# Patient Record
Sex: MALE | Race: WHITE | HISPANIC OR LATINO | Employment: OTHER | URBAN - METROPOLITAN AREA
[De-identification: names, ages, dates, MRNs, and addresses within clinical notes are randomized per-mention and may not be internally consistent; named-entity substitution may affect disease eponyms.]

---

## 2023-03-31 ENCOUNTER — HOSPITAL ENCOUNTER (EMERGENCY)
Facility: CLINIC | Age: 63
Discharge: HOME | End: 2023-03-31
Attending: EMERGENCY MEDICINE | Admitting: EMERGENCY MEDICINE
Payer: COMMERCIAL

## 2023-03-31 ENCOUNTER — APPOINTMENT (OUTPATIENT)
Dept: CARDIOLOGY | Facility: CLINIC | Age: 63
End: 2023-03-31
Attending: EMERGENCY MEDICINE
Payer: COMMERCIAL

## 2023-03-31 VITALS
WEIGHT: 162.92 LBS | TEMPERATURE: 97.9 F | BODY MASS INDEX: 26.18 KG/M2 | SYSTOLIC BLOOD PRESSURE: 146 MMHG | DIASTOLIC BLOOD PRESSURE: 94 MMHG | HEIGHT: 66 IN | RESPIRATION RATE: 18 BRPM | OXYGEN SATURATION: 98 % | HEART RATE: 72 BPM

## 2023-03-31 DIAGNOSIS — R00.2 PALPITATIONS: ICD-10-CM

## 2023-03-31 LAB
ANION GAP SERPL CALCULATED.3IONS-SCNC: 11 MMOL/L (ref 7–15)
BASOPHILS # BLD AUTO: 0 10E3/UL (ref 0–0.2)
BASOPHILS NFR BLD AUTO: 1 %
BUN SERPL-MCNC: 18.2 MG/DL (ref 8–23)
CALCIUM SERPL-MCNC: 9 MG/DL (ref 8.8–10.2)
CHLORIDE SERPL-SCNC: 103 MMOL/L (ref 98–107)
CREAT SERPL-MCNC: 0.67 MG/DL (ref 0.67–1.17)
DEPRECATED HCO3 PLAS-SCNC: 25 MMOL/L (ref 22–29)
EOSINOPHIL # BLD AUTO: 0.3 10E3/UL (ref 0–0.7)
EOSINOPHIL NFR BLD AUTO: 8 %
ERYTHROCYTE [DISTWIDTH] IN BLOOD BY AUTOMATED COUNT: 13.2 % (ref 10–15)
GFR SERPL CREATININE-BSD FRML MDRD: >90 ML/MIN/1.73M2
GLUCOSE SERPL-MCNC: 116 MG/DL (ref 70–99)
HCT VFR BLD AUTO: 42.2 % (ref 40–53)
HGB BLD-MCNC: 13.9 G/DL (ref 13.3–17.7)
HOLD SPECIMEN: NORMAL
HOLD SPECIMEN: NORMAL
IMM GRANULOCYTES # BLD: 0 10E3/UL
IMM GRANULOCYTES NFR BLD: 0 %
LYMPHOCYTES # BLD AUTO: 0.9 10E3/UL (ref 0.8–5.3)
LYMPHOCYTES NFR BLD AUTO: 24 %
MAGNESIUM SERPL-MCNC: 2.1 MG/DL (ref 1.7–2.3)
MCH RBC QN AUTO: 29.1 PG (ref 26.5–33)
MCHC RBC AUTO-ENTMCNC: 32.9 G/DL (ref 31.5–36.5)
MCV RBC AUTO: 88 FL (ref 78–100)
MONOCYTES # BLD AUTO: 0.4 10E3/UL (ref 0–1.3)
MONOCYTES NFR BLD AUTO: 12 %
NEUTROPHILS # BLD AUTO: 2 10E3/UL (ref 1.6–8.3)
NEUTROPHILS NFR BLD AUTO: 55 %
NRBC # BLD AUTO: 0 10E3/UL
NRBC BLD AUTO-RTO: 0 /100
PLATELET # BLD AUTO: 215 10E3/UL (ref 150–450)
POTASSIUM SERPL-SCNC: 4.1 MMOL/L (ref 3.4–5.3)
RBC # BLD AUTO: 4.78 10E6/UL (ref 4.4–5.9)
SODIUM SERPL-SCNC: 139 MMOL/L (ref 136–145)
TROPONIN T SERPL HS-MCNC: 6 NG/L
TROPONIN T SERPL HS-MCNC: <6 NG/L
TSH SERPL DL<=0.005 MIU/L-ACNC: 2.56 UIU/ML (ref 0.3–4.2)
WBC # BLD AUTO: 3.6 10E3/UL (ref 4–11)

## 2023-03-31 PROCEDURE — 84443 ASSAY THYROID STIM HORMONE: CPT | Performed by: EMERGENCY MEDICINE

## 2023-03-31 PROCEDURE — 36415 COLL VENOUS BLD VENIPUNCTURE: CPT | Performed by: EMERGENCY MEDICINE

## 2023-03-31 PROCEDURE — 99284 EMERGENCY DEPT VISIT MOD MDM: CPT

## 2023-03-31 PROCEDURE — 83735 ASSAY OF MAGNESIUM: CPT | Performed by: EMERGENCY MEDICINE

## 2023-03-31 PROCEDURE — 85025 COMPLETE CBC W/AUTO DIFF WBC: CPT | Performed by: EMERGENCY MEDICINE

## 2023-03-31 PROCEDURE — 93242 EXT ECG>48HR<7D RECORDING: CPT

## 2023-03-31 PROCEDURE — 84484 ASSAY OF TROPONIN QUANT: CPT | Performed by: EMERGENCY MEDICINE

## 2023-03-31 PROCEDURE — 80048 BASIC METABOLIC PNL TOTAL CA: CPT | Performed by: EMERGENCY MEDICINE

## 2023-03-31 PROCEDURE — 93005 ELECTROCARDIOGRAM TRACING: CPT | Mod: XU

## 2023-03-31 PROCEDURE — 84484 ASSAY OF TROPONIN QUANT: CPT | Mod: 91 | Performed by: EMERGENCY MEDICINE

## 2023-03-31 PROCEDURE — 85014 HEMATOCRIT: CPT | Performed by: EMERGENCY MEDICINE

## 2023-03-31 ASSESSMENT — ENCOUNTER SYMPTOMS
PALPITATIONS: 1
DIZZINESS: 0
SHORTNESS OF BREATH: 0
DIAPHORESIS: 0
LIGHT-HEADEDNESS: 0

## 2023-03-31 ASSESSMENT — ACTIVITIES OF DAILY LIVING (ADL)
ADLS_ACUITY_SCORE: 33
ADLS_ACUITY_SCORE: 33

## 2023-03-31 NOTE — ED PROVIDER NOTES
History     Chief Complaint:  Palpitations    The history is provided by the patient. The history is limited by a language barrier. A  was used.      Neil Rodriguez is a 62 year old male with a history of MI who presents with palpitations.  Neil was in his baseline state of health when he went to bed last night.  However, approximately 1.5 to 2 hours prior to arrival he was awoken from sleep due to palpitations which he describes as a sensation his heart was beating too rapidly.  He had no associated chest pain or dyspnea.  He did feel his hands and feet were sweating but did not have diffuse diaphoresis.  He was not lightheaded or dizzy.  He has had palpitations 3 other times in the last 2 months that resolved with drinking fluids so he tried to drink water.  This did not improve his symptoms and he felt he was actually getting worse even when he tried to lay down.  This prompted call to paramedics and transport to the hospital.  He does have a history of MI per his own report for which he has nitroglycerin.  He tried 1 tablet of this without improvement and a second tablet after 5 minutes, again without any change.   In total he estimates his palpitations lasted about 45 minutes.    Independent Historian: As above with assistance of .    Review of External Notes: I personally reviewed phone encounter from 8/10/22 regarding hydronephrosis of left kidney.    ROS:  Review of Systems   Constitutional: Negative for diaphoresis.   Respiratory: Negative for shortness of breath.    Cardiovascular: Positive for palpitations. Negative for chest pain and leg swelling.   Neurological: Negative for dizziness and light-headedness.   All other systems reviewed and are negative.    Allergies:  Shrimp  Pineapple     Medications:    As needed nitroglycerin  Metoprolol   Atorvastatin     Past Medical History:    Reports he had an MI 5 years ago    Social History:  The patient presents  "alone.   Does not have established primary care provider.     Physical Exam     Patient Vitals for the past 24 hrs:   BP Temp Temp src Pulse Resp SpO2 Height Weight   03/31/23 1040 (!) 146/94 -- -- 72 18 98 % -- --   03/31/23 0900 128/82 -- -- 75 16 99 % -- --   03/31/23 0510 (!) 150/94 97.9  F (36.6  C) Temporal 75 18 99 % 1.68 m (5' 6.14\") 73.9 kg (162 lb 14.7 oz)      Physical Exam  General: Well-developed and well-nourished. Well appearing middle-aged  man sitting in a recliner in fast-track 4. Cooperative.  Head:  Atraumatic.  Eyes:  Conjunctivae, lids, and sclerae are normal.  ENT:    Wearing a facemask.  Neck:  Supple. Normal range of motion.  CV:  Regular rate and rhythm. Normal heart sounds with no murmurs, rubs, or gallops detected.  Resp:  No respiratory distress. Clear to auscultation bilaterally without decreased breath sounds, wheezing, rales, or rhonchi.  GI:  Soft. Non-distended. Non-tender.    MS:  Normal ROM. No bilateral lower extremity edema or calf tenderness.  Skin:  Warm. Non-diaphoretic. No pallor.  Neuro:  Awake. A&Ox3. Normal strength.  Psych: Normal mood and affect. Normal speech.  Vitals reviewed.    Emergency Department Course   EKG  Indication: Palpitations  Time: 0505  Rate 78 bpm. VA interval 148. QRS duration 94. QT/QTc 370/421.   Sinus rhythm  Normal ECG  No acute ST changes.  No prior for comparison.    Laboratory:  Labs Ordered and Resulted from Time of ED Arrival to Time of ED Departure   BASIC METABOLIC PANEL - Abnormal       Result Value    Sodium 139      Potassium 4.1      Chloride 103      Carbon Dioxide (CO2) 25      Anion Gap 11      Urea Nitrogen 18.2      Creatinine 0.67      Calcium 9.0      Glucose 116 (*)     GFR Estimate >90     CBC WITH PLATELETS AND DIFFERENTIAL - Abnormal    WBC Count 3.6 (*)     RBC Count 4.78      Hemoglobin 13.9      Hematocrit 42.2      MCV 88      MCH 29.1      MCHC 32.9      RDW 13.2      Platelet Count 215      % Neutrophils 55      " % Lymphocytes 24      % Monocytes 12      % Eosinophils 8      % Basophils 1      % Immature Granulocytes 0      NRBCs per 100 WBC 0      Absolute Neutrophils 2.0      Absolute Lymphocytes 0.9      Absolute Monocytes 0.4      Absolute Eosinophils 0.3      Absolute Basophils 0.0      Absolute Immature Granulocytes 0.0      Absolute NRBCs 0.0     TROPONIN T, HIGH SENSITIVITY - Normal    Troponin T, High Sensitivity 6     TROPONIN T, HIGH SENSITIVITY - Normal    Troponin T, High Sensitivity <6     MAGNESIUM - Normal    Magnesium 2.1     TSH WITH FREE T4 REFLEX - Normal    TSH 2.56        Emergency Department Course & Assessments:  Independent Interpretation (X-rays, CTs, rhythm strip):  N/A    Consultations/Discussion of Management or Tests:  N/A    Social Determinants of Health affecting care:  Does not speak English.  No established primary care physician.    Assessments:  0745 I obtained history and examined the patient as noted above.     1005 I returned to check on patient. We discussed findings and plan of care with an . The patient is comfortable with discharge with holter monitor.     Disposition:  The patient was discharged.     Impression & Plan    Medical Decision Making:  Neil is a 63 year old man presenting with a 45-minute episode of palpitations without associated chest pain.  He did try nitroglycerin as he has a history of MI and this did not change his symptoms.  He has had 3 similar episodes recently.  He appears well on exam without any notable findings.    His EKG is reassuring without acute ST changes or arrhythmias.  Troponin is normal both initially and on repeat.  I have very low suspicion for ACS, particularly because he does not describe chest pain.  He does not have kidney injury, electrolyte derangement, anemia, or abnormal TSH.  Mild leukopenia to 3.6 is unlikely to be clinically relevant today.    He had no recurrence of palpitations while under my care and required no  interventions.  The exact etiology is unclear but certainly an intermittent arrhythmia is considered.  As such, he will be discharged with a Zio patch.  I have provided him with a clinic to arrange follow-up but indications for return were discussed.  All of his questions were answered and he verbalized understanding.  Amenable to discharge.    Diagnosis:    ICD-10-CM    1. Palpitations  R00.2            Scribe Disclosure:  I, Mel Jennifermagno, am serving as a scribe at 10:16 AM on 3/31/2023 to document services personally performed by Meagan Rodriguez MD based on my observations and the provider's statements to me.    3/31/2023   Meagan Rodriguez MD Dixson, Kylie S, MD  04/13/23 [id number]

## 2023-03-31 NOTE — ED TRIAGE NOTES
Arrived via EMS.  Pt awoke from sleep with palpations approximately 0400.  Reports nausea at that time. Pt took 2 nitroglycerin pills resolving pain.  EMS gave 324 mg ASA.      Triage Assessment     Row Name 03/31/23 0512       Triage Assessment (Adult)    Airway WDL WDL       Respiratory WDL    Respiratory WDL WDL       Skin Circulation/Temperature WDL    Skin Circulation/Temperature WDL WDL       Cardiac WDL    Cardiac WDL chest pain  Palpitations since resolved       Peripheral/Neurovascular WDL    Peripheral Neurovascular WDL WDL       Cognitive/Neuro/Behavioral WDL    Cognitive/Neuro/Behavioral WDL WDL

## 2023-03-31 NOTE — DISCHARGE INSTRUCTIONS
Return to heart monitor as instructed.  This will be watching for possible arrhythmia.  Please call the clinic provided to arrange a follow-up appointment.  They can check your cholesterol and go over the results of your heart monitor.  Return immediately if you have palpitations that do not resolve in a short period of time, chest pain, shortness of breath, or any other new or concerning symptoms.